# Patient Record
Sex: FEMALE | Race: WHITE | Employment: UNEMPLOYED | URBAN - NONMETROPOLITAN AREA
[De-identification: names, ages, dates, MRNs, and addresses within clinical notes are randomized per-mention and may not be internally consistent; named-entity substitution may affect disease eponyms.]

---

## 2019-07-10 RX ORDER — VIT C/B6/B5/MAGNESIUM/HERB 173 50-5-6-5MG
500 CAPSULE ORAL DAILY
COMMUNITY

## 2019-07-10 RX ORDER — CHLORAL HYDRATE 500 MG
2000 CAPSULE ORAL 2 TIMES DAILY
COMMUNITY
End: 2020-02-18

## 2019-07-10 RX ORDER — NITROGLYCERIN 0.4 MG/1
0.4 TABLET SUBLINGUAL EVERY 5 MIN PRN
COMMUNITY
End: 2020-02-18

## 2019-07-10 RX ORDER — MAGNESIUM GLUCONATE 27 MG(500)
400 TABLET ORAL DAILY
COMMUNITY

## 2019-07-10 RX ORDER — ASCORBIC ACID 500 MG
1000 TABLET ORAL DAILY
COMMUNITY
End: 2020-02-18

## 2019-07-10 RX ORDER — HYDROCHLOROTHIAZIDE 25 MG/1
25 TABLET ORAL DAILY
COMMUNITY

## 2019-07-10 RX ORDER — LEVOTHYROXINE SODIUM 0.07 MG/1
100 TABLET ORAL DAILY
COMMUNITY

## 2019-07-10 RX ORDER — ALPHA LIPOIC ACID 150 MG
CAPSULE ORAL DAILY
COMMUNITY
End: 2020-02-18

## 2019-07-10 RX ORDER — ROSUVASTATIN CALCIUM 5 MG/1
5 TABLET, COATED ORAL
COMMUNITY
End: 2020-02-18

## 2019-07-10 RX ORDER — POTASSIUM CHLORIDE 20 MEQ/1
20 TABLET, EXTENDED RELEASE ORAL DAILY
COMMUNITY

## 2019-07-10 RX ORDER — VITAMIN B COMPLEX
1 CAPSULE ORAL DAILY
COMMUNITY

## 2019-07-10 RX ORDER — ALANINE
POWDER (GRAM) MISCELLANEOUS
COMMUNITY
End: 2020-02-18

## 2019-07-10 RX ORDER — LOSARTAN POTASSIUM AND HYDROCHLOROTHIAZIDE 12.5; 5 MG/1; MG/1
0.5 TABLET ORAL DAILY
COMMUNITY

## 2019-07-11 ENCOUNTER — TELEPHONE (OUTPATIENT)
Dept: PULMONOLOGY | Age: 69
End: 2019-07-11

## 2019-07-11 ENCOUNTER — INITIAL CONSULT (OUTPATIENT)
Dept: PULMONOLOGY | Age: 69
End: 2019-07-11
Payer: COMMERCIAL

## 2019-07-11 VITALS
RESPIRATION RATE: 16 BRPM | OXYGEN SATURATION: 96 % | DIASTOLIC BLOOD PRESSURE: 78 MMHG | SYSTOLIC BLOOD PRESSURE: 122 MMHG | HEIGHT: 62 IN | WEIGHT: 183.4 LBS | BODY MASS INDEX: 33.75 KG/M2 | HEART RATE: 70 BPM

## 2019-07-11 DIAGNOSIS — R06.83 SNORING: Primary | ICD-10-CM

## 2019-07-11 DIAGNOSIS — I10 ESSENTIAL HYPERTENSION: ICD-10-CM

## 2019-07-11 DIAGNOSIS — G47.30 SLEEP APNEA, UNSPECIFIED TYPE: ICD-10-CM

## 2019-07-11 PROCEDURE — 99203 OFFICE O/P NEW LOW 30 MIN: CPT | Performed by: INTERNAL MEDICINE

## 2019-07-11 RX ORDER — MULTIVIT-MIN/IRON/FOLIC ACID/K 18-600-40
2000 CAPSULE ORAL DAILY
COMMUNITY

## 2019-07-11 NOTE — PROGRESS NOTES
Chief Complaint:  New patient sleep consult for snoring and fatigue    Mallampati airway Class:  II  Neck Circumference: 15 inches    [place] sleepiness score 7/11/19:  3    SAQLI: 67

## 2019-07-11 NOTE — PATIENT INSTRUCTIONS
Recommendations/Plan:  -Please obtain latest Echocardiogram report( ~2014) from [facility] office for review.  -Will schedule patient for polysomnogram in the sleep lab. -I had a discussion with patient regarding avialable treatment options for her sleep disorder breathing including but not limited to CPAP titration in the sleep lab Vs.Dental appliance placement with referral to a local dentist Vs other available surgical options including Uvulopalatopharyngoplasty, maxillomandibular ostomy and tracheostomy as last option. At the end of discussion, she is not decided on her   treatment if she found to have obstructive sleep apnea at this time.  -We will see Libertad Deluna back in 1week after the sleep study to go over the sleep study results and further management options.  -She was educated to practice good sleep hygiene practices. She  was provided with a good sleep hygiene hand out. -Carolyn was advised to make earlier appointment with my clinic if she develops any worsening of sleep symptoms. She verbalizes understanding.  -Carolyn was advised to not to drive any motor vehicles or operate heavy equipment until her sleep symptoms are under good control. Carolyn Main verbalizes understanding.  -She was advised to loose weight by controlling diet and doing exercise once cleared by her cardiologist.  - Libertad Deluna was educated about my impression and plan. She verbalizes understanding.

## 2019-07-11 NOTE — PROGRESS NOTES
of head injury in the past: No.    History of seizures: No.   Rest less legs syndrome symptoms:NO  History suggestive of periodic limb movements during sleep: NO  History suggestive of hypnagogic hallucinations: NO  History suggestive of hypnopompic hallucinations: NO  History suggestive of sleep talking:NO  History suggestive of sleep walking:NO  History suggestive of bruxism: Yes. [x] Uses mouth guard. History suggestive of cataplexy: NO  History suggestive of sleep paralysis: NO    Family history of sleep disorders:  Family history of obstructive sleep apnea: Yes. Family member diagnosed with obstructive sleep apnea sister. Family member using PAP therapy:  Yes. Family history of Narcolepsy: Yes. Family member diagnosed with Narcolepsy: Her nephew. Family history of Rest less legs syndrome : No.      History regarding old sleep studies:  Prior history of sleep study: No.  Using CPAP device: No.  Currently using home Oxygen: NO.        Patient considerations:  Is the patient is ambulatory: Yes  Patient is currently using: None of these Wheelchair, [place] or [place]. Para/Quadriplegic: NO  Hearing deficit : NO  Claustrophobic: NO  MDD : NO  Blind: NO  Incontinent: NO  Para/Quadraplegi: NO.   Need transportation to and from Sleep Center:NO      Social History:  Social History     Tobacco Use    Smoking status: Former Smoker     Packs/day: 0.25     Years: 5.00     Pack years: 1.25     Last attempt to quit: 1979     Years since quittin.0    Smokeless tobacco: Never Used   Substance Use Topics    Alcohol use: Not on file    Drug use: Not on file   . She is currently working: No.  She is retired.                          Past Medical History:   Diagnosis Date    Bladder cancer ([facility] [address]     Bradycardia     Depression     Hyperlipidemia     Hypertension     Hyperthyroidism        Past Surgical History:   Procedure Laterality Date    BLADDER TUMOR EXCISION       SECTION      CHOLECYSTECTOMY      DILATION AND CURETTAGE OF UTERUS      FOOT AMPUTATION Bilateral     Bunion    TONSILLECTOMY      TUBAL LIGATION      WRIST SURGERY         Allergies   Allergen Reactions    Keflex [Cephalexin] Rash       Current Outpatient Medications   Medication Sig Dispense Refill    Cholecalciferol (VITAMIN D) 2000 units CAPS capsule Take by mouth daily      CHROMIUM PO Take by mouth      Coenzyme Q10 (COQ-10) 100 MG CAPS Take by mouth daily      Garlic 443 MG TABS Take by mouth daily      hydrochlorothiazide (HYDRODIURIL) 25 MG tablet Take 25 mg by mouth daily      losartan-hydrochlorothiazide (HYZAAR) 50-12.5 MG per tablet Take 1 tablet by mouth daily      levothyroxine (SYNTHROID) 75 MCG tablet Take 75 mcg by mouth Daily      magnesium gluconate (MAGONATE) 500 MG tablet Take 400 mg by mouth daily       nitroGLYCERIN (NITROSTAT) 0.4 MG SL tablet Place 0.4 mg under the tongue every 5 minutes as needed for Chest pain up to max of 3 total doses. If no relief after 1 dose, call 911.  potassium chloride (KLOR-CON M) 20 MEQ extended release tablet Take 20 mEq by mouth daily      Turmeric 500 MG CAPS Take by mouth daily      vitamin C (ASCORBIC ACID) 500 MG tablet Take 1,000 mg by mouth daily      rosuvastatin (CRESTOR) 5 MG tablet Take 5 mg by mouth Mon Fr only      Omega-3 Fatty Acids (FISH OIL) 1000 MG CAPS Take 2,000 mg by mouth 2 times daily      L-Alanine POWD by Does not apply route      Lipoic Acid 150 MG CAPS Take by mouth daily      b complex vitamins capsule Take 1 capsule by mouth daily       No current facility-administered medications for this visit. No family history on file. Review of Systems:   General/Constitutional: She gained 10lbs of weight in the last 2years with decreased appetite. No fever or chills. HENT: Negative. Eyes: Negative. Upper respiratory tract: Occasional nasal stuffiness with post nasal drip. She is not using any nasal spray.   Lower

## 2019-08-07 DIAGNOSIS — G47.30 SLEEP APNEA, UNSPECIFIED TYPE: ICD-10-CM

## 2019-08-07 DIAGNOSIS — I10 ESSENTIAL HYPERTENSION: ICD-10-CM

## 2019-08-07 DIAGNOSIS — R06.83 SNORING: ICD-10-CM

## 2019-08-21 ENCOUNTER — OFFICE VISIT (OUTPATIENT)
Dept: PULMONOLOGY | Age: 69
End: 2019-08-21
Payer: COMMERCIAL

## 2019-08-21 VITALS
HEART RATE: 67 BPM | BODY MASS INDEX: 33.45 KG/M2 | SYSTOLIC BLOOD PRESSURE: 118 MMHG | OXYGEN SATURATION: 95 % | HEIGHT: 62 IN | WEIGHT: 181.8 LBS | DIASTOLIC BLOOD PRESSURE: 76 MMHG

## 2019-08-21 DIAGNOSIS — G89.29 OTHER CHRONIC PAIN: ICD-10-CM

## 2019-08-21 DIAGNOSIS — I10 BENIGN ESSENTIAL HTN: ICD-10-CM

## 2019-08-21 DIAGNOSIS — R09.82 POST-NASAL DRIP: ICD-10-CM

## 2019-08-21 DIAGNOSIS — G47.33 OSA (OBSTRUCTIVE SLEEP APNEA): Primary | ICD-10-CM

## 2019-08-21 DIAGNOSIS — F45.8 BRUXISM: ICD-10-CM

## 2019-08-21 PROCEDURE — 99214 OFFICE O/P EST MOD 30 MIN: CPT | Performed by: NURSE PRACTITIONER

## 2019-08-21 RX ORDER — FLUTICASONE PROPIONATE 50 MCG
1 SPRAY, SUSPENSION (ML) NASAL NIGHTLY PRN
Qty: 2 BOTTLE | Refills: 1
Start: 2019-08-21 | End: 2020-02-18

## 2019-08-21 RX ORDER — GABAPENTIN 100 MG/1
CAPSULE ORAL
Qty: 90 CAPSULE | Refills: 0 | Status: SHIPPED | OUTPATIENT
Start: 2019-08-21 | End: 2019-08-22

## 2019-08-21 NOTE — PATIENT INSTRUCTIONS
Patient Education        gabapentin  Pronunciation:  GA ba PEN tin  Brand:  Gralise, Horizant, Neurontin  What is the most important information I should know about gabapentin? Some people have thoughts about suicide while taking this medicine. Children taking gabapentin may have behavior changes. Stay alert to changes in your mood or symptoms. Report any new or worsening symptoms to your doctor. Do not stop using gabapentin suddenly, even if you feel fine. What is gabapentin? Gabapentin is an anti-epileptic drug, also called an anticonvulsant. It affects chemicals and nerves in the body that are involved in the cause of seizures and some types of pain. Gabapentin is used in adults to treat nerve pain caused by herpes virus or shingles (herpes zoster). The Horizant brand of gabapentin is also used to treat restless legs syndrome (RLS). The Neurontin brand of gabapentin is also used to treat seizures in adults and children who are at least 1years old. Use only the brand and form of gabapentin your doctor has prescribed. Check your medicine each time you get a refill to make sure you receive the correct form. Gabapentin may also be used for purposes not listed in this medication guide. What should I discuss with my healthcare provider before taking gabapentin? You should not use gabapentin if you are allergic to it. To make sure gabapentin is safe for you, tell your doctor if you have ever had:  · kidney disease (or if you are on dialysis);  · diabetes;  · depression, a mood disorder, or suicidal thoughts or actions;  · a seizure (unless you take gabapentin to treat seizures);  · liver disease;  · heart disease; or  · (for patients with RLS) if you are a day sleeper or work a night shift. Some people have thoughts about suicide while taking this medicine. Your doctor should check your progress at regular visits.  Your family or other caregivers should also be alert to changes in your mood or

## 2019-08-21 NOTE — PROGRESS NOTES
worsening apnea, 02 desaturation. Will follow up with overnight pulsox on RA with positional treatment. She uses mouth guard for Bruxism. Add Flonase PRN for PND. Follow up in 8 weeks.     Electronically signed by DAYANA Edwards CNP on 8/22/2019 at 9:50 AM

## 2019-08-22 ENCOUNTER — TELEPHONE (OUTPATIENT)
Dept: PULMONOLOGY | Age: 69
End: 2019-08-22